# Patient Record
Sex: FEMALE | Race: WHITE
[De-identification: names, ages, dates, MRNs, and addresses within clinical notes are randomized per-mention and may not be internally consistent; named-entity substitution may affect disease eponyms.]

---

## 2017-10-27 PROBLEM — Z00.00 ENCOUNTER FOR PREVENTIVE HEALTH EXAMINATION: Status: ACTIVE | Noted: 2017-10-27

## 2017-10-30 ENCOUNTER — APPOINTMENT (OUTPATIENT)
Dept: ANTEPARTUM | Facility: CLINIC | Age: 27
End: 2017-10-30
Payer: MEDICAID

## 2017-10-30 ENCOUNTER — ASOB RESULT (OUTPATIENT)
Age: 27
End: 2017-10-30

## 2017-10-30 PROCEDURE — 76819 FETAL BIOPHYS PROFIL W/O NST: CPT

## 2017-10-30 PROCEDURE — 76816 OB US FOLLOW-UP PER FETUS: CPT

## 2017-12-01 ENCOUNTER — OUTPATIENT (OUTPATIENT)
Dept: INPATIENT UNIT | Facility: HOSPITAL | Age: 27
LOS: 1 days | Discharge: HOME | End: 2017-12-01

## 2017-12-01 DIAGNOSIS — O47.9 FALSE LABOR, UNSPECIFIED: ICD-10-CM

## 2017-12-02 ENCOUNTER — INPATIENT (INPATIENT)
Facility: HOSPITAL | Age: 27
LOS: 2 days | Discharge: HOME | End: 2017-12-05
Attending: OBSTETRICS & GYNECOLOGY | Admitting: OBSTETRICS & GYNECOLOGY

## 2017-12-02 VITALS — HEIGHT: 64 IN | WEIGHT: 229.28 LBS

## 2017-12-02 LAB
ABO RH CONFIRMATION: SIGNIFICANT CHANGE UP
ALBUMIN SERPL ELPH-MCNC: 2.9 G/DL — LOW (ref 3.3–5)
ALP SERPL-CCNC: 157 U/L — HIGH (ref 40–120)
ALT FLD-CCNC: 26 U/L — SIGNIFICANT CHANGE UP (ref 12–78)
ANION GAP SERPL CALC-SCNC: 11 MMOL/L — SIGNIFICANT CHANGE UP (ref 5–17)
APTT BLD: 26.4 SEC — LOW (ref 27.5–37.4)
AST SERPL-CCNC: 26 U/L — SIGNIFICANT CHANGE UP (ref 15–37)
BASOPHILS # BLD AUTO: 0.1 K/UL — SIGNIFICANT CHANGE UP (ref 0–0.2)
BASOPHILS NFR BLD AUTO: 0.4 % — SIGNIFICANT CHANGE UP (ref 0–2)
BILIRUB SERPL-MCNC: 0.4 MG/DL — SIGNIFICANT CHANGE UP (ref 0.2–1.2)
BLD GP AB SCN SERPL QL: SIGNIFICANT CHANGE UP
BUN SERPL-MCNC: 9 MG/DL — SIGNIFICANT CHANGE UP (ref 7–23)
CALCIUM SERPL-MCNC: 9.9 MG/DL — SIGNIFICANT CHANGE UP (ref 8.5–10.1)
CHLORIDE SERPL-SCNC: 108 MMOL/L — SIGNIFICANT CHANGE UP (ref 96–108)
CO2 SERPL-SCNC: 19 MMOL/L — LOW (ref 22–31)
CREAT SERPL-MCNC: 0.61 MG/DL — SIGNIFICANT CHANGE UP (ref 0.5–1.3)
EOSINOPHIL # BLD AUTO: 0.1 K/UL — SIGNIFICANT CHANGE UP (ref 0–0.5)
EOSINOPHIL NFR BLD AUTO: 1.1 % — SIGNIFICANT CHANGE UP (ref 0–6)
GLUCOSE SERPL-MCNC: 111 MG/DL — HIGH (ref 70–99)
HCT VFR BLD CALC: 38.6 % — SIGNIFICANT CHANGE UP (ref 34.5–45)
HCT VFR BLD CALC: 39.3 % — SIGNIFICANT CHANGE UP (ref 34.5–45)
HCT VFR BLD CALC: 39.5 % — SIGNIFICANT CHANGE UP (ref 34.5–45)
HGB BLD-MCNC: 12.6 G/DL — SIGNIFICANT CHANGE UP (ref 11.5–15.5)
HGB BLD-MCNC: 12.9 G/DL — SIGNIFICANT CHANGE UP (ref 11.5–15.5)
HGB BLD-MCNC: 13.2 G/DL — SIGNIFICANT CHANGE UP (ref 11.5–15.5)
INR BLD: 0.88 RATIO — SIGNIFICANT CHANGE UP (ref 0.88–1.16)
LYMPHOCYTES # BLD AUTO: 2.7 K/UL — SIGNIFICANT CHANGE UP (ref 1–3.3)
LYMPHOCYTES # BLD AUTO: 21 % — SIGNIFICANT CHANGE UP (ref 13–44)
MAGNESIUM SERPL-MCNC: 4.2 MG/DL — HIGH (ref 1.6–2.6)
MCHC RBC-ENTMCNC: 28.3 PG — SIGNIFICANT CHANGE UP (ref 27–34)
MCHC RBC-ENTMCNC: 28.4 PG — SIGNIFICANT CHANGE UP (ref 27–34)
MCHC RBC-ENTMCNC: 29.3 PG — SIGNIFICANT CHANGE UP (ref 27–34)
MCHC RBC-ENTMCNC: 32.2 GM/DL — SIGNIFICANT CHANGE UP (ref 32–36)
MCHC RBC-ENTMCNC: 32.6 GM/DL — SIGNIFICANT CHANGE UP (ref 32–36)
MCHC RBC-ENTMCNC: 34.2 GM/DL — SIGNIFICANT CHANGE UP (ref 32–36)
MCV RBC AUTO: 85.8 FL — SIGNIFICANT CHANGE UP (ref 80–100)
MCV RBC AUTO: 87.2 FL — SIGNIFICANT CHANGE UP (ref 80–100)
MCV RBC AUTO: 87.9 FL — SIGNIFICANT CHANGE UP (ref 80–100)
MONOCYTES # BLD AUTO: 0.8 K/UL — SIGNIFICANT CHANGE UP (ref 0–0.9)
MONOCYTES NFR BLD AUTO: 6.3 % — SIGNIFICANT CHANGE UP (ref 2–14)
NEUTROPHILS # BLD AUTO: 9.3 K/UL — HIGH (ref 1.8–7.4)
NEUTROPHILS NFR BLD AUTO: 71.2 % — SIGNIFICANT CHANGE UP (ref 43–77)
PLATELET # BLD AUTO: 152 K/UL — SIGNIFICANT CHANGE UP (ref 150–400)
PLATELET # BLD AUTO: 159 K/UL — SIGNIFICANT CHANGE UP (ref 150–400)
PLATELET # BLD AUTO: 170 K/UL — SIGNIFICANT CHANGE UP (ref 150–400)
POTASSIUM SERPL-MCNC: 3.8 MMOL/L — SIGNIFICANT CHANGE UP (ref 3.5–5.3)
POTASSIUM SERPL-SCNC: 3.8 MMOL/L — SIGNIFICANT CHANGE UP (ref 3.5–5.3)
PROT SERPL-MCNC: 7 GM/DL — SIGNIFICANT CHANGE UP (ref 6–8.3)
PROTHROM AB SERPL-ACNC: 9.5 SEC — LOW (ref 9.8–12.7)
RBC # BLD: 4.47 M/UL — SIGNIFICANT CHANGE UP (ref 3.8–5.2)
RBC # BLD: 4.51 M/UL — SIGNIFICANT CHANGE UP (ref 3.8–5.2)
RBC # BLD: 4.53 M/UL — SIGNIFICANT CHANGE UP (ref 3.8–5.2)
RBC # FLD: 13.7 % — SIGNIFICANT CHANGE UP (ref 10.3–14.5)
RBC # FLD: 13.8 % — SIGNIFICANT CHANGE UP (ref 10.3–14.5)
RBC # FLD: 13.9 % — SIGNIFICANT CHANGE UP (ref 10.3–14.5)
SODIUM SERPL-SCNC: 138 MMOL/L — SIGNIFICANT CHANGE UP (ref 135–145)
T PALLIDUM AB TITR SER: NEGATIVE — SIGNIFICANT CHANGE UP
TYPE + AB SCN PNL BLD: SIGNIFICANT CHANGE UP
WBC # BLD: 13.1 K/UL — HIGH (ref 3.8–10.5)
WBC # BLD: 14.5 K/UL — HIGH (ref 3.8–10.5)
WBC # BLD: 16.8 K/UL — HIGH (ref 3.8–10.5)
WBC # FLD AUTO: 13.1 K/UL — HIGH (ref 3.8–10.5)
WBC # FLD AUTO: 14.5 K/UL — HIGH (ref 3.8–10.5)
WBC # FLD AUTO: 16.8 K/UL — HIGH (ref 3.8–10.5)

## 2017-12-02 RX ORDER — OXYTOCIN 10 UNIT/ML
333.33 VIAL (ML) INJECTION
Qty: 20 | Refills: 0 | Status: COMPLETED | OUTPATIENT
Start: 2017-12-02

## 2017-12-02 RX ORDER — SODIUM CHLORIDE 9 MG/ML
1000 INJECTION, SOLUTION INTRAVENOUS
Qty: 0 | Refills: 0 | Status: DISCONTINUED | OUTPATIENT
Start: 2017-12-02 | End: 2017-12-05

## 2017-12-02 RX ORDER — DEXAMETHASONE 0.5 MG/5ML
4 ELIXIR ORAL EVERY 6 HOURS
Qty: 0 | Refills: 0 | Status: DISCONTINUED | OUTPATIENT
Start: 2017-12-02 | End: 2017-12-05

## 2017-12-02 RX ORDER — MAGNESIUM SULFATE 500 MG/ML
2 VIAL (ML) INJECTION
Qty: 4 | Refills: 0 | Status: DISCONTINUED | OUTPATIENT
Start: 2017-12-02 | End: 2017-12-02

## 2017-12-02 RX ORDER — MAGNESIUM SULFATE 500 MG/ML
4 VIAL (ML) INJECTION ONCE
Qty: 0 | Refills: 0 | Status: COMPLETED | OUTPATIENT
Start: 2017-12-02 | End: 2017-12-02

## 2017-12-02 RX ORDER — OXYTOCIN 10 UNIT/ML
2 VIAL (ML) INJECTION
Qty: 30 | Refills: 0 | Status: DISCONTINUED | OUTPATIENT
Start: 2017-12-02 | End: 2017-12-05

## 2017-12-02 RX ORDER — MAGNESIUM SULFATE 500 MG/ML
2 VIAL (ML) INJECTION
Qty: 4 | Refills: 0 | Status: DISCONTINUED | OUTPATIENT
Start: 2017-12-02 | End: 2017-12-03

## 2017-12-02 RX ORDER — OXYTOCIN 10 UNIT/ML
333.33 VIAL (ML) INJECTION
Qty: 20 | Refills: 0 | Status: COMPLETED | OUTPATIENT
Start: 2017-12-02 | End: 2017-12-02

## 2017-12-02 RX ORDER — CITRIC ACID/SODIUM CITRATE 300-500 MG
15 SOLUTION, ORAL ORAL EVERY 4 HOURS
Qty: 0 | Refills: 0 | Status: DISCONTINUED | OUTPATIENT
Start: 2017-12-02 | End: 2017-12-02

## 2017-12-02 RX ORDER — FERROUS SULFATE 325(65) MG
325 TABLET ORAL DAILY
Qty: 0 | Refills: 0 | Status: DISCONTINUED | OUTPATIENT
Start: 2017-12-02 | End: 2017-12-05

## 2017-12-02 RX ORDER — HYDROMORPHONE HYDROCHLORIDE 2 MG/ML
1 INJECTION INTRAMUSCULAR; INTRAVENOUS; SUBCUTANEOUS
Qty: 0 | Refills: 0 | Status: DISCONTINUED | OUTPATIENT
Start: 2017-12-02 | End: 2017-12-05

## 2017-12-02 RX ORDER — ACETAMINOPHEN 500 MG
650 TABLET ORAL EVERY 6 HOURS
Qty: 0 | Refills: 0 | Status: DISCONTINUED | OUTPATIENT
Start: 2017-12-02 | End: 2017-12-05

## 2017-12-02 RX ORDER — NALOXONE HYDROCHLORIDE 4 MG/.1ML
0.1 SPRAY NASAL
Qty: 0 | Refills: 0 | Status: DISCONTINUED | OUTPATIENT
Start: 2017-12-02 | End: 2017-12-05

## 2017-12-02 RX ORDER — OXYCODONE AND ACETAMINOPHEN 5; 325 MG/1; MG/1
1 TABLET ORAL
Qty: 0 | Refills: 0 | Status: DISCONTINUED | OUTPATIENT
Start: 2017-12-02 | End: 2017-12-05

## 2017-12-02 RX ORDER — OXYTOCIN 10 UNIT/ML
41.67 VIAL (ML) INJECTION
Qty: 20 | Refills: 0 | Status: DISCONTINUED | OUTPATIENT
Start: 2017-12-02 | End: 2017-12-05

## 2017-12-02 RX ORDER — DOCUSATE SODIUM 100 MG
100 CAPSULE ORAL
Qty: 0 | Refills: 0 | Status: DISCONTINUED | OUTPATIENT
Start: 2017-12-02 | End: 2017-12-05

## 2017-12-02 RX ORDER — HYDRALAZINE HCL 50 MG
5 TABLET ORAL ONCE
Qty: 0 | Refills: 0 | Status: DISCONTINUED | OUTPATIENT
Start: 2017-12-02 | End: 2017-12-05

## 2017-12-02 RX ORDER — OXYCODONE AND ACETAMINOPHEN 5; 325 MG/1; MG/1
2 TABLET ORAL EVERY 6 HOURS
Qty: 0 | Refills: 0 | Status: DISCONTINUED | OUTPATIENT
Start: 2017-12-02 | End: 2017-12-05

## 2017-12-02 RX ORDER — LANOLIN
1 OINTMENT (GRAM) TOPICAL
Qty: 0 | Refills: 0 | Status: DISCONTINUED | OUTPATIENT
Start: 2017-12-02 | End: 2017-12-05

## 2017-12-02 RX ORDER — DIPHENHYDRAMINE HCL 50 MG
25 CAPSULE ORAL EVERY 6 HOURS
Qty: 0 | Refills: 0 | Status: DISCONTINUED | OUTPATIENT
Start: 2017-12-02 | End: 2017-12-05

## 2017-12-02 RX ORDER — OXYTOCIN 10 UNIT/ML
333.33 VIAL (ML) INJECTION
Qty: 20 | Refills: 0 | Status: DISCONTINUED | OUTPATIENT
Start: 2017-12-02 | End: 2017-12-05

## 2017-12-02 RX ORDER — TETANUS TOXOID, REDUCED DIPHTHERIA TOXOID AND ACELLULAR PERTUSSIS VACCINE, ADSORBED 5; 2.5; 8; 8; 2.5 [IU]/.5ML; [IU]/.5ML; UG/.5ML; UG/.5ML; UG/.5ML
0.5 SUSPENSION INTRAMUSCULAR ONCE
Qty: 0 | Refills: 0 | Status: DISCONTINUED | OUTPATIENT
Start: 2017-12-02 | End: 2017-12-03

## 2017-12-02 RX ORDER — OXYCODONE HYDROCHLORIDE 5 MG/1
10 TABLET ORAL
Qty: 0 | Refills: 0 | Status: DISCONTINUED | OUTPATIENT
Start: 2017-12-02 | End: 2017-12-05

## 2017-12-02 RX ORDER — SIMETHICONE 80 MG/1
80 TABLET, CHEWABLE ORAL EVERY 4 HOURS
Qty: 0 | Refills: 0 | Status: DISCONTINUED | OUTPATIENT
Start: 2017-12-02 | End: 2017-12-05

## 2017-12-02 RX ORDER — DIPHENHYDRAMINE HCL 50 MG
25 CAPSULE ORAL EVERY 4 HOURS
Qty: 0 | Refills: 0 | Status: DISCONTINUED | OUTPATIENT
Start: 2017-12-02 | End: 2017-12-05

## 2017-12-02 RX ORDER — SODIUM CHLORIDE 9 MG/ML
1000 INJECTION, SOLUTION INTRAVENOUS
Qty: 0 | Refills: 0 | Status: DISCONTINUED | OUTPATIENT
Start: 2017-12-02 | End: 2017-12-03

## 2017-12-02 RX ORDER — IBUPROFEN 200 MG
600 TABLET ORAL EVERY 6 HOURS
Qty: 0 | Refills: 0 | Status: DISCONTINUED | OUTPATIENT
Start: 2017-12-02 | End: 2017-12-05

## 2017-12-02 RX ORDER — OXYCODONE HYDROCHLORIDE 5 MG/1
5 TABLET ORAL
Qty: 0 | Refills: 0 | Status: DISCONTINUED | OUTPATIENT
Start: 2017-12-02 | End: 2017-12-05

## 2017-12-02 RX ORDER — ONDANSETRON 8 MG/1
4 TABLET, FILM COATED ORAL EVERY 6 HOURS
Qty: 0 | Refills: 0 | Status: DISCONTINUED | OUTPATIENT
Start: 2017-12-02 | End: 2017-12-05

## 2017-12-02 RX ORDER — MORPHINE SULFATE 50 MG/1
5 CAPSULE, EXTENDED RELEASE ORAL ONCE
Qty: 0 | Refills: 0 | Status: DISCONTINUED | OUTPATIENT
Start: 2017-12-02 | End: 2017-12-05

## 2017-12-02 RX ORDER — GLYCERIN ADULT
1 SUPPOSITORY, RECTAL RECTAL AT BEDTIME
Qty: 0 | Refills: 0 | Status: DISCONTINUED | OUTPATIENT
Start: 2017-12-02 | End: 2017-12-05

## 2017-12-02 RX ORDER — LABETALOL HCL 100 MG
200 TABLET ORAL EVERY 8 HOURS
Qty: 0 | Refills: 0 | Status: DISCONTINUED | OUTPATIENT
Start: 2017-12-02 | End: 2017-12-03

## 2017-12-02 RX ORDER — BUTORPHANOL TARTRATE 2 MG/ML
1 INJECTION, SOLUTION INTRAMUSCULAR; INTRAVENOUS ONCE
Qty: 0 | Refills: 0 | Status: DISCONTINUED | OUTPATIENT
Start: 2017-12-02 | End: 2017-12-02

## 2017-12-02 RX ORDER — SODIUM CHLORIDE 9 MG/ML
1000 INJECTION, SOLUTION INTRAVENOUS ONCE
Qty: 0 | Refills: 0 | Status: COMPLETED | OUTPATIENT
Start: 2017-12-02 | End: 2017-12-02

## 2017-12-02 RX ORDER — SODIUM CHLORIDE 9 MG/ML
1000 INJECTION, SOLUTION INTRAVENOUS
Qty: 0 | Refills: 0 | Status: DISCONTINUED | OUTPATIENT
Start: 2017-12-02 | End: 2017-12-02

## 2017-12-02 RX ADMIN — Medication 1000 MILLIUNIT(S)/MIN: at 14:45

## 2017-12-02 RX ADMIN — HYDROMORPHONE HYDROCHLORIDE 1 MILLIGRAM(S): 2 INJECTION INTRAMUSCULAR; INTRAVENOUS; SUBCUTANEOUS at 19:51

## 2017-12-02 RX ADMIN — Medication 2 MILLIUNIT(S)/MIN: at 08:35

## 2017-12-02 RX ADMIN — Medication 50 GM/HR: at 17:27

## 2017-12-02 RX ADMIN — BUTORPHANOL TARTRATE 1 MILLIGRAM(S): 2 INJECTION, SOLUTION INTRAMUSCULAR; INTRAVENOUS at 07:00

## 2017-12-02 RX ADMIN — SODIUM CHLORIDE 125 MILLILITER(S): 9 INJECTION, SOLUTION INTRAVENOUS at 07:26

## 2017-12-02 RX ADMIN — SODIUM CHLORIDE 125 MILLILITER(S): 9 INJECTION, SOLUTION INTRAVENOUS at 07:08

## 2017-12-02 RX ADMIN — Medication 300 GRAM(S): at 14:15

## 2017-12-02 RX ADMIN — SODIUM CHLORIDE 2000 MILLILITER(S): 9 INJECTION, SOLUTION INTRAVENOUS at 05:20

## 2017-12-02 RX ADMIN — Medication 200 MILLIGRAM(S): at 19:37

## 2017-12-02 RX ADMIN — Medication 50 GM/HR: at 21:06

## 2017-12-02 RX ADMIN — HYDROMORPHONE HYDROCHLORIDE 1 MILLIGRAM(S): 2 INJECTION INTRAMUSCULAR; INTRAVENOUS; SUBCUTANEOUS at 16:20

## 2017-12-02 RX ADMIN — Medication 50 GM/HR: at 23:13

## 2017-12-02 RX ADMIN — Medication 50 GM/HR: at 17:23

## 2017-12-02 NOTE — PATIENT PROFILE OB - VISION (WITH CORRECTIVE LENSES IF THE PATIENT USUALLY WEARS THEM):
moderate assist (50% patients effort) Normal vision: sees adequately in most situations; can see medication labels, newsprint

## 2017-12-02 NOTE — PROVIDER CONTACT NOTE (OTHER) - SITUATION
Pt primary c/s delivery @ 14:42, recovery began at 1525. Pt began magnesium sulfate infusion @ 1415 due to preeclampsia. Received care of pt @1915.

## 2017-12-02 NOTE — PROVIDER CONTACT NOTE (OTHER) - ACTION/TREATMENT ORDERED:
MD Giles made aware, pt assessed, no new orders at this time for medications. Labs to be drawn @ 20:00, will continue to monitor.

## 2017-12-03 ENCOUNTER — TRANSCRIPTION ENCOUNTER (OUTPATIENT)
Age: 27
End: 2017-12-03

## 2017-12-03 LAB
BASOPHILS # BLD AUTO: 0 K/UL — SIGNIFICANT CHANGE UP (ref 0–0.2)
BASOPHILS NFR BLD AUTO: 0.3 % — SIGNIFICANT CHANGE UP (ref 0–2)
EOSINOPHIL # BLD AUTO: 0 K/UL — SIGNIFICANT CHANGE UP (ref 0–0.5)
EOSINOPHIL NFR BLD AUTO: 0.1 % — SIGNIFICANT CHANGE UP (ref 0–6)
HCT VFR BLD CALC: 35.8 % — SIGNIFICANT CHANGE UP (ref 34.5–45)
HGB BLD-MCNC: 12.1 G/DL — SIGNIFICANT CHANGE UP (ref 11.5–15.5)
LYMPHOCYTES # BLD AUTO: 1.5 K/UL — SIGNIFICANT CHANGE UP (ref 1–3.3)
LYMPHOCYTES # BLD AUTO: 9.2 % — LOW (ref 13–44)
MAGNESIUM SERPL-MCNC: 4.4 MG/DL — HIGH (ref 1.6–2.6)
MAGNESIUM SERPL-MCNC: 4.4 MG/DL — HIGH (ref 1.6–2.6)
MCHC RBC-ENTMCNC: 29.4 PG — SIGNIFICANT CHANGE UP (ref 27–34)
MCHC RBC-ENTMCNC: 33.9 GM/DL — SIGNIFICANT CHANGE UP (ref 32–36)
MCV RBC AUTO: 86.8 FL — SIGNIFICANT CHANGE UP (ref 80–100)
MONOCYTES # BLD AUTO: 0.9 K/UL — SIGNIFICANT CHANGE UP (ref 0–0.9)
MONOCYTES NFR BLD AUTO: 5.6 % — SIGNIFICANT CHANGE UP (ref 2–14)
NEUTROPHILS # BLD AUTO: 13.4 K/UL — HIGH (ref 1.8–7.4)
NEUTROPHILS NFR BLD AUTO: 84.8 % — HIGH (ref 43–77)
PLATELET # BLD AUTO: 148 K/UL — LOW (ref 150–400)
RBC # BLD: 4.13 M/UL — SIGNIFICANT CHANGE UP (ref 3.8–5.2)
RBC # FLD: 13.8 % — SIGNIFICANT CHANGE UP (ref 10.3–14.5)
WBC # BLD: 15.8 K/UL — HIGH (ref 3.8–10.5)
WBC # FLD AUTO: 15.8 K/UL — HIGH (ref 3.8–10.5)

## 2017-12-03 RX ORDER — OXYCODONE HYDROCHLORIDE 5 MG/1
1 TABLET ORAL
Qty: 30 | Refills: 0 | OUTPATIENT
Start: 2017-12-03

## 2017-12-03 RX ORDER — SENNA PLUS 8.6 MG/1
2 TABLET ORAL AT BEDTIME
Qty: 0 | Refills: 0 | Status: DISCONTINUED | OUTPATIENT
Start: 2017-12-03 | End: 2017-12-05

## 2017-12-03 RX ORDER — IBUPROFEN 200 MG
1 TABLET ORAL
Qty: 40 | Refills: 2 | OUTPATIENT
Start: 2017-12-03 | End: 2018-03-02

## 2017-12-03 RX ORDER — LABETALOL HCL 100 MG
200 TABLET ORAL
Qty: 0 | Refills: 0 | Status: DISCONTINUED | OUTPATIENT
Start: 2017-12-03 | End: 2017-12-05

## 2017-12-03 RX ADMIN — Medication 200 MILLIGRAM(S): at 03:17

## 2017-12-03 RX ADMIN — Medication 50 GM/HR: at 01:01

## 2017-12-03 RX ADMIN — Medication 1 TABLET(S): at 13:40

## 2017-12-03 RX ADMIN — Medication 100 MILLIGRAM(S): at 13:40

## 2017-12-03 RX ADMIN — Medication 600 MILLIGRAM(S): at 21:22

## 2017-12-03 RX ADMIN — OXYCODONE HYDROCHLORIDE 10 MILLIGRAM(S): 5 TABLET ORAL at 21:22

## 2017-12-03 RX ADMIN — Medication 100 MILLIGRAM(S): at 21:22

## 2017-12-03 RX ADMIN — SIMETHICONE 80 MILLIGRAM(S): 80 TABLET, CHEWABLE ORAL at 21:22

## 2017-12-03 RX ADMIN — Medication 50 GM/HR: at 10:45

## 2017-12-03 RX ADMIN — SODIUM CHLORIDE 125 MILLILITER(S): 9 INJECTION, SOLUTION INTRAVENOUS at 04:37

## 2017-12-03 RX ADMIN — Medication 325 MILLIGRAM(S): at 13:40

## 2017-12-03 RX ADMIN — Medication 50 GM/HR: at 03:18

## 2017-12-03 RX ADMIN — Medication 50 GM/HR: at 08:27

## 2017-12-03 RX ADMIN — Medication 600 MILLIGRAM(S): at 13:40

## 2017-12-03 RX ADMIN — OXYCODONE AND ACETAMINOPHEN 1 TABLET(S): 5; 325 TABLET ORAL at 13:40

## 2017-12-03 NOTE — DISCHARGE NOTE OB - MEDICATION SUMMARY - MEDICATIONS TO TAKE
I will START or STAY ON the medications listed below when I get home from the hospital:    ibuprofen 600 mg oral tablet  -- 1 tab(s) by mouth every 6 hours, As needed, Moderate Pain -for moderate pain MDD:4  -- Indication: For for moderate pain, postpartum    acetaminophen-oxyCODONE 325 mg-5 mg oral tablet  -- 1 tab(s) by mouth every 4 hours, As needed for severe or Moderate postoperative Pain (4 - 6) MDD:8 tablets  -- Indication: For for severe pain only    Prenatal 1 oral capsule  -- 1 cap(s) by mouth once a day  -- Indication: For Continue while breast feeding    calcium-vitamin D 500 mg-200 intl units oral tablet  -- 1 tab(s) by mouth 2 times a day  while breast feeding   -- Indication: For Continue while breast feeding

## 2017-12-03 NOTE — DISCHARGE NOTE OB - PATIENT PORTAL LINK FT
“You can access the FollowHealth Patient Portal, offered by [facility], by registering with the following website: [email or web address]”

## 2017-12-03 NOTE — PROGRESS NOTE ADULT - SUBJECTIVE AND OBJECTIVE BOX
Postpartum Note,  Section #1   Patient is a 27y woman G2 P 2. Developed hypertension post . Was managed with IV magnesium, hydralazine and PO labetolol.     Subjective:  Patient seen and examined at bedside. No acute events overnight.  Pain well controlled with current pain regimen. She is has been on bedrest and ambulated out of bed to sit on a chair. Is tolerating PO diet/fluids. She reports normal postpartum bleeding, having used 3 pads over the last 24 hrs. She is voiding well and reports flatus / BM. Reports breastfeeding without difficulties. Denies fever, headache, nausea, vomiting. Otherwise, patient feels well without additional complaints.     Physical exam:    Vital Signs Last 24 Hrs  T(C): 37.1 (03 Dec 2017 07:20), Max: 37.6 (03 Dec 2017 05:30)  T(F): 98.7 (03 Dec 2017 07:20), Max: 99.6 (03 Dec 2017 05:30)  HR: 111 (03 Dec 2017 07:20) (104 - 131)  BP: 95/54 (03 Dec 2017 07:20) (95/54 - 164/83)  BP(mean): --  RR: 18 (03 Dec 2017 07:20) (10 - 115)  SpO2: 98% (03 Dec 2017 07:20) (95% - 99%)    Gen: NAD  Breast: Soft, nontender, not engorged  Cardio: S1,S2 no murmurs  Lungs: CTA B/L, no wheezing  Abdomen: Soft, nontender, no distension, firm uterine fundus at umbilicus.  Incision: Clean, dry, and intact  Ext: No calf tenderness bilaterally    LABS:                        12.1   15.8  )-----------( 148      ( 03 Dec 2017 07:09 )             35.8       Rubella status:     Allergies    No Known Allergies    Intolerances      MEDICATIONS  (STANDING):  diphtheria/tetanus/pertussis (acellular) Vaccine (ADAcel) 0.5 milliLiter(s) IntraMuscular once  ferrous    sulfate 325 milliGRAM(s) Oral daily  hydrALAZINE Injectable 5 milliGRAM(s) IV Push once  labetalol 200 milliGRAM(s) Oral every 8 hours  lactated ringers. 1000 milliLiter(s) (125 mL/Hr) IV Continuous <Continuous>  lactated ringers. 1000 milliLiter(s) (125 mL/Hr) IV Continuous <Continuous>  magnesium sulfate Infusion 2 Gm/Hr (50 mL/Hr) IV Continuous <Continuous>  morphine PF Epidural 5 milliGRAM(s) Epidural once  oxytocin Infusion 333.333 milliUNIT(s)/Min (1000 mL/Hr) IV Continuous <Continuous>  oxytocin Infusion 41.667 milliUNIT(s)/Min (125 mL/Hr) IV Continuous <Continuous>  oxytocin Infusion 2 milliUNIT(s)/Min (2 mL/Hr) IV Continuous <Continuous>  oxytocin Infusion 41.667 milliUNIT(s)/Min (125 mL/Hr) IV Continuous <Continuous>  prenatal multivitamin 1 Tablet(s) Oral daily    MEDICATIONS  (PRN):  acetaminophen   Tablet 650 milliGRAM(s) Oral every 6 hours PRN For Temp greater than 38.5 C (101.3 F)  dexamethasone  Injectable 4 milliGRAM(s) IV Push every 6 hours PRN Nausea, IF ondansetron is ineffective after 30 - 60 minutes  diphenhydrAMINE   Capsule 25 milliGRAM(s) Oral every 6 hours PRN Itching  diphenhydrAMINE   Injectable 25 milliGRAM(s) IV Push every 4 hours PRN Pruritus  docusate sodium 100 milliGRAM(s) Oral two times a day PRN Stool Softening  glycerin Suppository - Adult 1 Suppository(s) Rectal at bedtime PRN Constipation  HYDROmorphone  Injectable 1 milliGRAM(s) IV Push every 3 hours PRN Severe Pain  ibuprofen  Tablet 600 milliGRAM(s) Oral every 6 hours PRN Mild pain or headache  lanolin Ointment 1 Application(s) Topical every 3 hours PRN Sore Nipples  naloxone Injectable 0.1 milliGRAM(s) IV Push every 3 minutes PRN For ANY of the following changes in patient status:  A. RR LESS THAN 10 breaths per minute, B. Oxygen saturation LESS THAN 90%, C. Sedation score of 6  ondansetron Injectable 4 milliGRAM(s) IV Push every 6 hours PRN Nausea  oxyCODONE    5 mG/acetaminophen 325 mG 1 Tablet(s) Oral every 3 hours PRN Moderate Pain  oxyCODONE    5 mG/acetaminophen 325 mG 2 Tablet(s) Oral every 6 hours PRN Severe Pain  oxyCODONE    IR 5 milliGRAM(s) Oral every 3 hours PRN Mild Pain  oxyCODONE    IR 10 milliGRAM(s) Oral every 3 hours PRN Moderate Pain  simethicone 80 milliGRAM(s) Chew every 4 hours PRN Gas        Assessment and Plan  POD # s/p   -Routine post-op care.  - continue Labetolol 200 mg tid  - continue magnesium 2mg/hr  -Pain well controlled, continue current pain regimen.  -Encouraged ambulation.  -Encouraged PO diet/fluids.  -Encouraged breastfeeding. Postpartum Note,  Section #1   Patient is a 27y woman G2 P 2. Developed hypertension post . Was managed with IV magnesium, hydralazine and PO labetolol.     Subjective:  Patient seen and examined at bedside. No acute events overnight.  Pain well controlled with current pain regimen. She is has been on bedrest and ambulated out of bed to sit on a chair. Is tolerating PO diet/fluids. She reports normal postpartum bleeding, having used 3 pads over the last 24 hrs. She is voiding well and reports flatus / BM. Reports breastfeeding without difficulties. Denies fever, headache, nausea, vomiting. Otherwise, patient feels well without additional complaints.     Physical exam:    Vital Signs Last 24 Hrs  T(C): 37.1 (03 Dec 2017 07:20), Max: 37.6 (03 Dec 2017 05:30)  T(F): 98.7 (03 Dec 2017 07:20), Max: 99.6 (03 Dec 2017 05:30)  HR: 111 (03 Dec 2017 07:20) (104 - 131)  BP: 95/54 (03 Dec 2017 07:20) (95/54 - 164/83)  BP(mean): --  RR: 18 (03 Dec 2017 07:20) (10 - 115)  SpO2: 98% (03 Dec 2017 07:20) (95% - 99%)    Gen: NAD  Breast: Soft, nontender, not engorged  Cardio: S1,S2 no murmurs  Lungs: CTA B/L, no wheezing  Abdomen: Soft, nontender, softly distended, firm uterine fundus at umbilicus.  Incision:  dressing Clean, dry, and intact  Ext: No calf tenderness bilaterally    LABS:                        12.1   15.8  )-----------( 148      ( 03 Dec 2017 07:09 )             35.8       Rubella status:     Allergies    No Known Allergies    Intolerances      MEDICATIONS  (STANDING):  diphtheria/tetanus/pertussis (acellular) Vaccine (ADAcel) 0.5 milliLiter(s) IntraMuscular once  ferrous    sulfate 325 milliGRAM(s) Oral daily  hydrALAZINE Injectable 5 milliGRAM(s) IV Push once  labetalol 200 milliGRAM(s) Oral every 8 hours  lactated ringers. 1000 milliLiter(s) (125 mL/Hr) IV Continuous <Continuous>  lactated ringers. 1000 milliLiter(s) (125 mL/Hr) IV Continuous <Continuous>  magnesium sulfate Infusion 2 Gm/Hr (50 mL/Hr) IV Continuous <Continuous>  morphine PF Epidural 5 milliGRAM(s) Epidural once  oxytocin Infusion 333.333 milliUNIT(s)/Min (1000 mL/Hr) IV Continuous <Continuous>  oxytocin Infusion 41.667 milliUNIT(s)/Min (125 mL/Hr) IV Continuous <Continuous>  oxytocin Infusion 2 milliUNIT(s)/Min (2 mL/Hr) IV Continuous <Continuous>  oxytocin Infusion 41.667 milliUNIT(s)/Min (125 mL/Hr) IV Continuous <Continuous>  prenatal multivitamin 1 Tablet(s) Oral daily    MEDICATIONS  (PRN):  acetaminophen   Tablet 650 milliGRAM(s) Oral every 6 hours PRN For Temp greater than 38.5 C (101.3 F)  dexamethasone  Injectable 4 milliGRAM(s) IV Push every 6 hours PRN Nausea, IF ondansetron is ineffective after 30 - 60 minutes  diphenhydrAMINE   Capsule 25 milliGRAM(s) Oral every 6 hours PRN Itching  diphenhydrAMINE   Injectable 25 milliGRAM(s) IV Push every 4 hours PRN Pruritus  docusate sodium 100 milliGRAM(s) Oral two times a day PRN Stool Softening  glycerin Suppository - Adult 1 Suppository(s) Rectal at bedtime PRN Constipation  HYDROmorphone  Injectable 1 milliGRAM(s) IV Push every 3 hours PRN Severe Pain  ibuprofen  Tablet 600 milliGRAM(s) Oral every 6 hours PRN Mild pain or headache  lanolin Ointment 1 Application(s) Topical every 3 hours PRN Sore Nipples  naloxone Injectable 0.1 milliGRAM(s) IV Push every 3 minutes PRN For ANY of the following changes in patient status:  A. RR LESS THAN 10 breaths per minute, B. Oxygen saturation LESS THAN 90%, C. Sedation score of 6  ondansetron Injectable 4 milliGRAM(s) IV Push every 6 hours PRN Nausea  oxyCODONE    5 mG/acetaminophen 325 mG 1 Tablet(s) Oral every 3 hours PRN Moderate Pain  oxyCODONE    5 mG/acetaminophen 325 mG 2 Tablet(s) Oral every 6 hours PRN Severe Pain  oxyCODONE    IR 5 milliGRAM(s) Oral every 3 hours PRN Mild Pain  oxyCODONE    IR 10 milliGRAM(s) Oral every 3 hours PRN Moderate Pain  simethicone 80 milliGRAM(s) Chew every 4 hours PRN Gas        Assessment and Plan  POD # s/p primary   -Routine post-op care.  - continue Labetolol 200 mg tid  - continue magnesium 2mg/hr- d/c in mid afternoon.  -Pain well controlled, continue current pain regimen.  -Encouraged ambulation.  -Encouraged PO diet/fluids.  -Encouraged breastfeeding.

## 2017-12-03 NOTE — DISCHARGE NOTE OB - HOSPITAL COURSE
----- Message from Analy Menon MD sent at 11/19/2017  7:56 PM CST -----  Fasting BS is very mild 101/99, but otherwise all labs are good  Watch for sweets in the holiday season   28 yo  at 41 weeks gestation presented with ruptured membranes on L+D.  She was admitted for labor.  Meconium stained fluid was noted.  She arrested in dilatation and required a primary  for delivery with  infant weighing 10lb 3oz.  Pt also had elevated blood pressures requiring hydralazine iv push and labetolol 200mg po.  She was given MgSO4 per protocol for approx 24 hours after delivery.  Her postpartum CBC:                        12.1   15.8  )-----------( 148      ( 03 Dec 2017 07:09 )             35.8   Her blood pressures normalized by POD#1 and she required to further antihypertensive.  Her recovery course was uneventful and she is being discharged [facility] in stable condition on postoperative day #3.

## 2017-12-03 NOTE — DISCHARGE NOTE OB - CARE PLAN
Principal Discharge DX:	 delivery, delivered, current hospitalization  Goal:	recovery  Instructions for follow-up, activity and diet:	regular diet, no heavy lifting for six weeks, no sex for six weeks, follow up in one week for incision check in office  Secondary Diagnosis:	Pre-eclampsia affecting childbirth  Goal:	recheck blood pressure in office in one week.

## 2017-12-03 NOTE — DISCHARGE NOTE OB - CARE PROVIDER_API CALL
Yao Silvestre (DO), Obstetrics and Gynecology  [address] [place] [address]  Phone: [phone]  Fax: [phone]

## 2017-12-04 RX ADMIN — OXYCODONE HYDROCHLORIDE 10 MILLIGRAM(S): 5 TABLET ORAL at 05:15

## 2017-12-04 RX ADMIN — SIMETHICONE 80 MILLIGRAM(S): 80 TABLET, CHEWABLE ORAL at 22:23

## 2017-12-04 RX ADMIN — Medication 600 MILLIGRAM(S): at 22:23

## 2017-12-04 RX ADMIN — Medication 1 TABLET(S): at 12:32

## 2017-12-04 RX ADMIN — SIMETHICONE 80 MILLIGRAM(S): 80 TABLET, CHEWABLE ORAL at 05:15

## 2017-12-04 RX ADMIN — SENNA PLUS 2 TABLET(S): 8.6 TABLET ORAL at 22:17

## 2017-12-04 RX ADMIN — Medication 600 MILLIGRAM(S): at 23:30

## 2017-12-04 RX ADMIN — Medication 600 MILLIGRAM(S): at 12:32

## 2017-12-04 RX ADMIN — Medication 600 MILLIGRAM(S): at 05:15

## 2017-12-04 NOTE — PROGRESS NOTE ADULT - SUBJECTIVE AND OBJECTIVE BOX
Postpartum Note,  Section  27y woman post-operative day:  #2    Subjective:  She is ambulating and tolerating regular diet.  Pt denies dizziness  She denies nausea and vomiting.    Physical exam:    Vital Signs Last 24 Hrs  T(C): 36.4 (04 Dec 2017 08:36), Max: 37.9 (03 Dec 2017 14:00)  T(F): 97.5 (04 Dec 2017 08:36), Max: 100.3 (03 Dec 2017 14:00)  HR: 101 (04 Dec 2017 08:36) (100 - 123)  BP: 129/79 (04 Dec 2017 08:36) (101/53 - 136/67)  RR: 16 (04 Dec 2017 08:36) (16 - 18)  SpO2: 98% (04 Dec 2017 08:36) (98% - 100%)  Lungs:  clear to auscultation bilaterally.  Abdomen: Soft, nontender, no distension , firm uterine fundus at umbilicus.  Incision: Clean, dry, and intact.  Pelvic: light to moderate lochia noted  Ext: No calf tenderness    LABS:                        12.1   15.8  )-----------( 148      ( 03 Dec 2017 07:09 )             35.8         Assessment and Plan  POD #2   s/p primary , s/p MgSO4 til yesterday afternoon.  Doing well.  Encourage ambulation.

## 2017-12-05 VITALS
SYSTOLIC BLOOD PRESSURE: 130 MMHG | TEMPERATURE: 97 F | HEART RATE: 104 BPM | RESPIRATION RATE: 16 BRPM | OXYGEN SATURATION: 99 % | DIASTOLIC BLOOD PRESSURE: 82 MMHG

## 2017-12-05 NOTE — PROGRESS NOTE ADULT - SUBJECTIVE AND OBJECTIVE BOX
Postpartum Note,  Section  27y woman post-operative day:  #3    Subjective:  She is ambulating and tolerating regular diet.   She is passing flatus.    Physical exam:    Vital Signs Last 24 Hrs  T(C): 36.3 (05 Dec 2017 07:53), Max: 37.6 (04 Dec 2017 20:56)  T(F): 97.4 (05 Dec 2017 07:53), Max: 99.6 (04 Dec 2017 20:56)  HR: 104 (05 Dec 2017 07:53) (98 - 104)  BP: 130/82 (05 Dec 2017 07:53) (130/82 - 142/78)  RR: 16 (05 Dec 2017 07:53) (16 - 16)  SpO2: 99% (05 Dec 2017 07:53) (98% - 100%)  Lungs:  clear to auscultation bilaterally.  Abdomen: Soft, nontender, no distention , firm uterine fundus at umbilicus.  Incision: Clean, dry, and intact.  Pelvic: light  lochia noted  Ext: No calf tenderness    Assessment and Plan  POD #3   s/p primary   Doing well.  No evidence of elevated blood pressures.  Will check BP in one week in the office.  Encourage ambulation.  Follow up in 4-7 days for post-op incision check, then in six weeks for postpartum appointment.  Pt advised to follow up sooner as an outpt if she has any difficulties with breast feeding/depression/temperature over 100.2'F.

## 2017-12-07 DIAGNOSIS — O47.9 FALSE LABOR, UNSPECIFIED: ICD-10-CM

## 2017-12-10 DIAGNOSIS — Z3A.41 41 WEEKS GESTATION OF PREGNANCY: ICD-10-CM

## 2018-01-26 NOTE — PATIENT PROFILE OB - BREAST MILK PROVIDES PROTECTION AGAINST INFECTION
Statement Selected External Cooling Fan Speed: 5 Energy (Optional- Include Units): 34 Pre-Procedure Text: After consent was obtained, the treatment areas were cleansed and treated using the parameters mentioned above. Detail Level: Zone Total Pulses (Optional): 15 Energy (Optional- Include Units): 34 Anesthesia Type: 1% lidocaine with epinephrine Consent: Written consent obtained, risks reviewed including but not limited to crusting, scabbing, blistering, scarring, darker or lighter pigmentary change, bruising, and/or incomplete response. Post-Care Instructions: I reviewed with the patient in detail post-care instructions. Patient should stay away from the sun and wear sun protection until treated areas are fully healed. Contact: Light Anesthesia Volume In Cc: 0 Treatment Number (Optional): 1

## 2019-05-31 NOTE — DISCHARGE NOTE OB - PLAN OF CARE
recovery regular diet, no heavy lifting for six weeks, no sex for six weeks, follow up in one week for incision check in office recheck blood pressure in office in one week. n/a

## 2020-09-15 ENCOUNTER — EMERGENCY (EMERGENCY)
Facility: HOSPITAL | Age: 30
LOS: 0 days | Discharge: HOME | End: 2020-09-15
Attending: EMERGENCY MEDICINE
Payer: MEDICAID

## 2020-09-15 VITALS
HEART RATE: 80 BPM | DIASTOLIC BLOOD PRESSURE: 86 MMHG | OXYGEN SATURATION: 100 % | RESPIRATION RATE: 18 BRPM | TEMPERATURE: 98 F | SYSTOLIC BLOOD PRESSURE: 156 MMHG

## 2020-09-15 VITALS — HEIGHT: 64 IN

## 2020-09-15 DIAGNOSIS — S61.012A LACERATION WITHOUT FOREIGN BODY OF LEFT THUMB WITHOUT DAMAGE TO NAIL, INITIAL ENCOUNTER: ICD-10-CM
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
## 2020-09-15 DIAGNOSIS — Z23 ENCOUNTER FOR IMMUNIZATION: ICD-10-CM
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
## 2020-09-15 PROCEDURE — 12002 RPR S/N/AX/GEN/TRNK2.6-7.5CM: CPT

## 2020-09-15 PROCEDURE — 99283 EMERGENCY DEPT VISIT LOW MDM: CPT | Mod: 25

## 2020-09-15 PROCEDURE — 73130 X-RAY EXAM OF HAND: CPT | Mod: 26,LT

## 2020-09-15 PROCEDURE — 99284 EMERGENCY DEPT VISIT MOD MDM: CPT

## 2020-09-15 PROCEDURE — 73130 X-RAY EXAM OF HAND: CPT | Mod: LT

## 2020-09-15 PROCEDURE — 90471 IMMUNIZATION ADMIN: CPT

## 2020-09-15 PROCEDURE — 90715 TDAP VACCINE 7 YRS/> IM: CPT

## 2020-09-15 RX ORDER — CEPHALEXIN 500 MG
500 CAPSULE ORAL ONCE
Refills: 0 | Status: COMPLETED | OUTPATIENT
Start: 2020-09-15 | End: 2020-09-15

## 2020-09-15 RX ORDER — CEPHALEXIN 500 MG
1 CAPSULE ORAL
Qty: 6 | Refills: 0
Start: 2020-09-15 | End: 2020-09-16

## 2020-09-15 RX ORDER — TETANUS TOXOID, REDUCED DIPHTHERIA TOXOID AND ACELLULAR PERTUSSIS VACCINE, ADSORBED 5; 2.5; 8; 8; 2.5 [IU]/.5ML; [IU]/.5ML; UG/.5ML; UG/.5ML; UG/.5ML
0.5 SUSPENSION INTRAMUSCULAR ONCE
Refills: 0 | Status: COMPLETED | OUTPATIENT
Start: 2020-09-15 | End: 2020-09-15

## 2020-09-15 RX ADMIN — Medication 500 MILLIGRAM(S): at 18:49

## 2020-09-15 RX ADMIN — TETANUS TOXOID, REDUCED DIPHTHERIA TOXOID AND ACELLULAR PERTUSSIS VACCINE, ADSORBED 0.5 MILLILITER(S): 5; 2.5; 8; 8; 2.5 SUSPENSION INTRAMUSCULAR at 18:49

## 2020-09-15 NOTE — ED ADULT TRIAGE NOTE - CHIEF COMPLAINT QUOTE
Patient presents to ED complaining of laceration to right hand after cutting it with a knife at work. Pt states that her boss put salt on it to help clot the blood. Bleeding controlled in triage.

## 2020-09-15 NOTE — ED STATDOCS - CARE PLAN
Principal Discharge DX:	Laceration of hand without foreign body, unspecified laterality, initial encounter  Goal:	Laceration of the hand, spoke with Dr. Sanches, Dr. Sanches wishes for us to close the wound, to see her in the office tomorrow.

## 2020-09-15 NOTE — ED STATDOCS - MDM PATIENT STATEMENT FOR ADDL TREATMENT
Dr. Obrien was able to see patient on floor before discharging. Dr. Obrien stated, \"he's okay\" to discharge.    Patient with one or more new problems requiring additional work-up/treatment.

## 2020-09-15 NOTE — ED ADULT NURSE NOTE - OBJECTIVE STATEMENT
pt to ed from work after sustaining laceration to right and near thumb with a knife. complains of mild pain. minimal bleedining. no other complaints noted. pt a&ox4, ambulatory. able to move hand, denies numbness and tingling. pulses palpable. skin warm and pink. No distress.

## 2020-09-15 NOTE — ED STATDOCS - EYES, MLM
clear bilaterally.  Pupils equal, round, and reactive to light. clear bilaterally.  Pupils equal, round, and reactive to light. Extraocular movements are intact. Visual fields are intact.

## 2020-09-15 NOTE — ED STATDOCS - CARDIAC, MLM
normal rate, regular rhythm, and no murmur. normal rate, regular rhythm, and no gallops or rubs. 2+ pulses in dp and radial arteries.

## 2020-09-15 NOTE — ED STATDOCS - ATTENDING CONTRIBUTION TO CARE
I Sonido Hendricks MD saw and examined the patient. MLP saw and examined the patient under my supervision. I discussed the care of the patient with MLP and agree with MLP's plan, assessment and care of the patient while in the ED.

## 2020-09-15 NOTE — ED STATDOCS - CLINICAL SUMMARY MEDICAL DECISION MAKING FREE TEXT BOX
Plan for x-ray of hand, irrigation of wound to clear the wound from salt and debris. Laceration is deep and overlying tendon of thumb, will consult hand to see if hand is comfortable with us repairing it versus whether ortho should come down for repair.

## 2020-09-15 NOTE — ED STATDOCS - CONSTITUTIONAL, MLM
normal... well appearing and in no apparent distress. Non-toxic, well appearing and in no apparent distress. Non-toxic, well appearing and in no apparent distress. NAD.

## 2020-09-15 NOTE — ED STATDOCS - OBJECTIVE STATEMENT
30 YOF no PMHx presents to the ED s/p laceration at approximately 16:30 today. Pt says she accidently cut the dorsal aspect of her left hand, between the webbing of the index finger and thumb, while cutting some lettuce PTA. Pt applied salt to the region and came to ED for further care and evaluation. Denies f/c, cough, chest pain, shortness of breath, abd pain, hematochezia, melena, hematemesis, headache. No PSHx. Tetanus is not UTD. NKDA. Non-smoker. No EtOH use. No illicit drug use.

## 2020-09-15 NOTE — ED PROVIDER NOTE - CARE PLAN
Principal Discharge DX:	Laceration of hand, foreign body presence unspecified, unspecified laterality, initial encounter  Goal:	Spoke with Ortho hand, ortho hand Dr. Sanches states patient can go home, will see patient in the office tomorrow

## 2020-09-15 NOTE — ED STATDOCS - SKIN, MLM
skin normal color for race, warm, dry and intact. +1 inch laceration on the left hand on the dorsum on the webbing between index finger and thumb.

## 2020-09-15 NOTE — ED STATDOCS - PROGRESS NOTE DETAILS
Ashok ROBERTSON for ED attending Dr. Hendricks: Spoke with Dr. Sanches who is ortho hand on call, explained my concern for small laceration of tendon in the thumb that is likely extensor pollicis longus. She explained there is no emergent need to repair tendon in the ED, recommends we close the wound. She also recommends pt to be on ABx and wishes pt to call her tomorrow at which point she will evaluate to see if pt needs further exploration. Return contact: [phone]. Repair of right hand laceration documented in procedure note. [place] NP Reviewed instructions with patient  ID # [id number]. Mikel BARAHONA Ashok ROBERTSON for ED attending Dr. Hendricks: Spoke with Dr. Sanches who is ortho hand on call, explained my concern for small laceration of tendon in the thumb that is likely extensor pollicis longus. She explained there is no emergent need to repair tendon in the ED, no need to see patient or sent residents, recommends we close the wound. She also recommends pt to be on ABx and wishes pt to call her tomorrow at which point she will evaluate to see if pt needs further exploration. Return contact: [phone].

## 2020-09-15 NOTE — ED STATDOCS - NS_ ATTENDINGSCRIBEDETAILS _ED_A_ED_FT
I Sonido Hendricks MD saw and examined the patient. Scribe documented for me and under my supervision. I have modified the scribe's documentation where necessary to reflect my history, physical exam and other relevant documentations pertinent to the care of the patient.

## 2020-09-15 NOTE — ED STATDOCS - CARE PROVIDER_API CALL
Azeb Sanches)  Orthopaedic Surgery  [address] [place] [address]  Phone: [phone]  Fax: [phone]  Follow Up Time:

## 2020-09-15 NOTE — ED STATDOCS - PATIENT PORTAL LINK FT
You can access the FollowMyHealth Patient Portal offered by [facility] by registering at the following website: [email or web address]. By joining [employer]’s FollowMyHealth portal, you will also be able to view your health information using other applications (apps) compatible with our system.

## 2020-09-15 NOTE — ED STATDOCS - PLAN OF CARE
Laceration of the hand, spoke with Dr. Sanches, Dr. Sanches wishes for us to close the wound, to see her in the office tomorrow.

## 2020-09-15 NOTE — ED STATDOCS - NEUROLOGICAL, MLM
+1 inch laceration on the left hand on the dorsum on the webbing between index finger and thumb. NIH = 0. GCS = 15. 5/5 strength on flexion and extension b/l ue and le. NIH = 0. GCS = 15. 5/5 strength on flexion and extension b/l ue and le. CNs 2-12 intact. No nuchal rigidity.

## 2020-09-15 NOTE — ED PROVIDER NOTE - PLAN OF CARE
Spoke with Ortho hand, ortho hand Dr. Sanches states patient can go home, will see patient in the office tomorrow

## 2020-09-15 NOTE — ED STATDOCS - ENMT, MLM
Nasal mucosa clear.  Mouth with normal mucosa  Throat has no vesicles, no oropharyngeal exudates and uvula is midline. Nasal mucosa clear. No nuchal rigidity. Mouth with normal mucosa  Throat has no vesicles, no oropharyngeal exudates and uvula is midline.

## 2020-09-15 NOTE — ED STATDOCS - NSFOLLOWUPINSTRUCTIONS_ED_ALL_ED_FT
Please follow up with your primary care physician. If you have any worsening of the symptoms including worsening pain return to us immediately.    We have performed an x-ray of your hand my evaluation and the radiology report does not show any evidence of any foreign body or any evidence of bone fractures. I have discussed your presentation with our orthopedic hand physician Dr. Sanches. She stated that there is no emergent need for intervention but that she wants you to see her tomorrow in her office. I have provided you with the number for the orthopedic physician for you.     Please note that we have provided you with antibiotics to prevent any infection from developing in the laceration, and that you are to take these antibiotics unless discontinued by Dr. Sanches or any orthopedic hand doctor that you need to follow up with.    Please return to us immediately if you have any other health concerns including any fever, chills, worsening of pain, difficulty moving thumb or finger or any other health concerns.    As we discussed I do not see any immediate evidence of tendon involvement but given the acute nature of the laceration, you are to see Dr. Sanches as soon as possible. Dr. Sanches is expecting you and will see you tomorrow morning.

## 2022-12-07 ENCOUNTER — EMERGENCY (EMERGENCY)
Facility: HOSPITAL | Age: 32
LOS: 1 days | Discharge: HOME | End: 2022-12-07
Attending: EMERGENCY MEDICINE | Admitting: EMERGENCY MEDICINE
Payer: MEDICAID

## 2022-12-07 VITALS
TEMPERATURE: 98 F | OXYGEN SATURATION: 99 % | HEART RATE: 88 BPM | DIASTOLIC BLOOD PRESSURE: 85 MMHG | RESPIRATION RATE: 18 BRPM | SYSTOLIC BLOOD PRESSURE: 135 MMHG

## 2022-12-07 VITALS
TEMPERATURE: 98 F | DIASTOLIC BLOOD PRESSURE: 94 MMHG | WEIGHT: 205.03 LBS | OXYGEN SATURATION: 98 % | SYSTOLIC BLOOD PRESSURE: 150 MMHG | HEART RATE: 110 BPM | RESPIRATION RATE: 22 BRPM | HEIGHT: 65 IN

## 2022-12-07 LAB
FLUAV AG NPH QL: DETECTED
FLUBV AG NPH QL: SIGNIFICANT CHANGE UP
RSV RNA NPH QL NAA+NON-PROBE: SIGNIFICANT CHANGE UP
SARS-COV-2 RNA SPEC QL NAA+PROBE: SIGNIFICANT CHANGE UP

## 2022-12-07 PROCEDURE — 99284 EMERGENCY DEPT VISIT MOD MDM: CPT

## 2022-12-07 PROCEDURE — 99283 EMERGENCY DEPT VISIT LOW MDM: CPT

## 2022-12-07 PROCEDURE — 87637 SARSCOV2&INF A&B&RSV AMP PRB: CPT

## 2022-12-07 RX ORDER — ACETAMINOPHEN 500 MG
650 TABLET ORAL ONCE
Refills: 0 | Status: COMPLETED | OUTPATIENT
Start: 2022-12-07 | End: 2022-12-07

## 2022-12-07 RX ADMIN — Medication 650 MILLIGRAM(S): at 21:33

## 2022-12-07 NOTE — ED ADULT NURSE NOTE - OBJECTIVE STATEMENT
The patient is a 32y Female complaining of headache x 3 days.  Denies visual change, N/V/D, CP or SOB.  Neuro intact.

## 2022-12-07 NOTE — ED PROVIDER NOTE - PATIENT PORTAL LINK FT
You can access the FollowMyHealth Patient Portal offered by [facility] by registering at the following website: [email or web address]. By joining [employer]’s FollowMyHealth portal, you will also be able to view your health information using other applications (apps) compatible with our system.

## 2022-12-07 NOTE — ED PROVIDER NOTE - NSFOLLOWUPINSTRUCTIONS_ED_ALL_ED_FT
Viral Respiratory Infection    A viral respiratory infection is an illness that affects parts of the body used for breathing, like the lungs, nose, and throat. It is caused by a germ called a virus. Symptoms can include runny nose, coughing, sneezing, fatigue, body aches, sore throat, fever, or headache. Over the counter medicine can be used to manage the symptoms but the infection typically goes away on its own in 5 to 10 days.     SEEK IMMEDIATE MEDICAL CARE IF YOU HAVE ANY OF THE FOLLOWING SYMPTOMS: shortness of breath, chest pain, fever over 10 days, or lightheadedness/dizziness.      1. TAKE ALL MEDICATIONS AS DIRECTED.  FOR PAIN YOU CAN TAKE IBUPROFEN (MOTRIN, ADVIL) OR ACETAMINOPHEN (TYLENOL) AS NEEDED, AS DIRECTED ON PACKAGING.  2. FOLLOW UP WITH ___YOUR PRIMARY CARE DOCTOR_______ AS DIRECTED.  YOU WERE GIVEN COPIES OF ALL LABS AND IMAGING RESULTS FROM YOUR ER VISIT--PLEASE TAKE THEM WITH YOU TO YOUR APPOINTMENT.  3. IF NEEDED, CALL [phone] TO FIND A PRIMARY CARE PHYSICIAN.  OR CALL [phone] TO MAKE AN APPOINTMENT WITH THE MEDICAL CLINIC.  4. RETURN TO THE ER FOR ANY WORSENING SYMPTOMS.

## 2022-12-07 NOTE — ED ADULT NURSE NOTE - CHIEF COMPLAINT
Auth: NPR  Date: 8/31/2020  Reference # None  Spoke with: None  Type of SX: Outpatient  Location: [facility]  CPT [id number]   SX area: Rt hand  Insurance: [place]
The patient is a 32y Female complaining of headache.

## 2022-12-07 NOTE — ED PROVIDER NOTE - PHYSICAL EXAMINATION
PE:   GEN: Awake, alert, interactive, NAD, non-toxic appearing.   HEAD: Atraumatic  EYES: Sclera white, conjunctiva pink, PERRLA  MOUTH/THROAT: Patent, uvula midline, no tonsillar edema or erythema, no acute dental findings, no oral lesions or ulcerations, no Hoarse voice  LYMPH: No pre/post auricular, submandibular, or cervical chain lymphadenopathy   CARDIAC: Reg rate and rhythm, S1,S2, no murmur/rub/gallop. Strong central and peripheral pulses, Brisk cap refill, no evident pedal edema.   RESP: No distress noted. L/S clear = Bilat without accessory muscle use, wheeze, rhonchi, rales.   ABD: soft, supple, non-tender, no guarding. BS x 4, normoactive.   NEURO: AOx3, CN II-XII grossly intact without focal deficit.   MSK: Moving all extremities with no apparent deformities.   SKIN: Warm, dry, normal color, without apparent rashes.

## 2022-12-07 NOTE — ED PROVIDER NOTE - OBJECTIVE STATEMENT
31 y/o f with no pmh presents to ED for c.o Headache, sore throat, chills, body aches x 3 days. no fever. Also decreased PO intake. No abd pain nasuea vomit ting or diarrhea. Denies neck or back pain, chest pain, cough, SOB. Pt is vaccinated for covid but not flu.

## 2022-12-07 NOTE — ED PROVIDER NOTE - ATTENDING APP SHARED VISIT CONTRIBUTION OF CARE
Surendra Augustin MD: I have personally performed a face to face diagnostic evaluation on this patient.  I have reviewed the PA note and agree with the history, exam, and plan of care, except as noted.  History and Exam by me shows same findings as documented

## 2022-12-08 PROBLEM — Z78.9 OTHER SPECIFIED HEALTH STATUS: Chronic | Status: ACTIVE | Noted: 2020-09-25

## 2023-02-22 NOTE — ED STATDOCS - PRINCIPAL DIAGNOSIS
02/22/23 1029   Discharge Assessment   Assessment Type Discharge Planning Assessment   Confirmed/corrected address, phone number and insurance Yes   Confirmed Demographics Correct on Facesheet   Source of Information patient   When was your last doctors appointment?   (Patient reports 7th of this month.)   Communicated ANNE with patient/caregiver Yes   Reason For Admission SOB   People in Home significant other   Do you expect to return to your current living situation? Yes   Do you have help at home or someone to help you manage your care at home? Yes   Who are your caregiver(s) and their phone number(s)? Significant Other: Kanu Navarrete: [phone] and Sister (POA): Misty Austin: [phone]   Prior to hospitilization cognitive status: Unable to Assess   Current cognitive status: Alert/Oriented   Home Accessibility stairs to enter home   Number of Stairs, Main Entrance six   Home Layout Able to live on 1st floor   Equipment Currently Used at Home shower chair   Readmission within 30 days? No   Patient currently being followed by outpatient case management? No   Do you currently have service(s) that help you manage your care at home? No   Do you take prescription medications? Yes   Do you have prescription coverage? Yes   Coverage Medicare Part A & B   Do you have any problems affording any of your prescribed medications? No   Is the patient taking medications as prescribed? yes   Who is going to help you get home at discharge? Daughter, Misty Austin.   How do you get to doctors appointments? car, drives self   Are you on dialysis? No   Do you take coumadin? No   Discharge Plan A Home with family   Discharge Plan B Home   DME Needed Upon Discharge  none   Discharge Plan discussed with: Patient   Discharge Barriers Identified None   OTHER   Name(s) of People in Home Patient resides with her Fiance, Kanu Navarrete.       Pt's PCP is Juan Manuel Payne.  Pt reports her significant other, Kanu  Landon is currently hospitalized at [facility].  Pt reports her POA is her sister, Misty Austin ([phone]).  No barriers to discharge at this time.   Laceration of hand without foreign body, unspecified laterality, initial encounter

## 2023-07-27 ENCOUNTER — EMERGENCY (EMERGENCY)
Facility: HOSPITAL | Age: 33
LOS: 0 days | Discharge: HOME | End: 2023-07-28
Attending: STUDENT IN AN ORGANIZED HEALTH CARE EDUCATION/TRAINING PROGRAM
Payer: MEDICAID

## 2023-07-27 VITALS
TEMPERATURE: 99 F | DIASTOLIC BLOOD PRESSURE: 96 MMHG | HEART RATE: 104 BPM | OXYGEN SATURATION: 100 % | RESPIRATION RATE: 18 BRPM | SYSTOLIC BLOOD PRESSURE: 150 MMHG

## 2023-07-27 VITALS — HEIGHT: 67 IN | WEIGHT: 201.94 LBS

## 2023-07-27 DIAGNOSIS — L03.112 CELLULITIS OF LEFT AXILLA: ICD-10-CM

## 2023-07-27 PROCEDURE — 99283 EMERGENCY DEPT VISIT LOW MDM: CPT

## 2023-07-27 RX ORDER — CEPHALEXIN 500 MG
500 CAPSULE ORAL ONCE
Refills: 0 | Status: COMPLETED | OUTPATIENT
Start: 2023-07-27 | End: 2023-07-27

## 2023-07-27 RX ORDER — CEPHALEXIN 500 MG
1 CAPSULE ORAL
Qty: 28 | Refills: 0
Start: 2023-07-27 | End: 2023-08-02

## 2023-07-27 NOTE — ED ADULT TRIAGE NOTE - CHIEF COMPLAINT QUOTE
Pt ambulatory to ED c/o L flank redness, itchiness x 1 week. Pt denies any chills/fever. Pt has hx HTN and NKDA. No medications PTA. Pt AOX4 and no s/s of distress.

## 2023-07-28 RX ADMIN — Medication 500 MILLIGRAM(S): at 00:16

## 2023-07-28 NOTE — ED STATDOCS - CLINICAL SUMMARY MEDICAL DECISION MAKING FREE TEXT BOX
adult female comes in with spreading skin cellulitis.  vitals are normal no fever.  she is well-appearing.  antibiotics given sent to pharmacy will discharge.

## 2023-07-28 NOTE — ED STATDOCS - NORMAL, MLM
Pt would like all scripts moved to [facility], she can not afford the co-pays that [facility] is requiring. Thanks!   shiva all pertinent systems normal

## 2023-07-28 NOTE — ED STATDOCS - SKIN, MLM
skin normal color for race, warm, dry and intact.   cellulitis is present in a large area under the left axilla extending into the left lower chest.  it was marked out with a surgical marker.

## 2023-07-28 NOTE — ED STATDOCS - PATIENT PORTAL LINK FT
You can access the FollowMyHealth Patient Portal offered by [facility] by registering at the following website: [email or web address]. By joining [employer]’s FollowMyHealth portal, you will also be able to view your health information using other applications (apps) compatible with our system.

## 2023-07-28 NOTE — ED STATDOCS - OBJECTIVE STATEMENT
33-year-old female no past medical history nondiabetic comes into the ED with red painful rash under her left axilla spreading around her left upper abdomen into her left lower chest and up towards her left breast.  Redness has been spreading over days.  Painful to touch.  No fevers no chills.  Feels well otherwise.  No vomiting.  No open wounds or trauma.

## 2023-07-28 NOTE — ED STATDOCS - PROGRESS NOTE DETAILS
cellulitis marked with surgical marker.  keflex given.  keflex sent to her pharmacy.  given patient strict return precautions if the rash and cellulitis is spreading.  otherwise outpatient follow-up.

## 2023-07-28 NOTE — ED STATDOCS - NSFOLLOWUPINSTRUCTIONS_ED_ALL_ED_FT
take keflex 4 times a day for a week.    Seek immediate medical assistance for any new or worsening symptoms. If you have issues obtaining follow up, please call: [phone] or [phone]  to obtain a doctor or specialist who takes your insurance in your area.     \  tome keflex 4 veces al día mulugeta daysi semana.    Busque asistencia médica inmediata para cualquier síntoma nuevo o que empeore. Si tiene problemas para obtener un seguimiento, llame al: [phone] o al [phone] para obtener un médico o especialista que acepte rangel seguro en rangel área.

## 2023-07-28 NOTE — ED ADULT NURSE NOTE - PRO INTERPRETER NEED 2
Bahraini F: none  E: K >4, Mag >2  N: dysphagia; pureed and thin liquids per speech/swallow  GI: Famotidine 20mg daily  DVT ppx: lovenox 40mg subQ q24h  Code Status: DNR/DNI, confirmed NOT comfort care F: D5+LR @65cc/hr x 24hrs  E: K >4, Mag >2  N: dysphagia; pureed and thin liquids per speech/swallow  GI: Famotidine 20mg daily  DVT ppx: lovenox 40mg subQ q24h  Code Status: DNR/DNI, confirmed NOT comfort care F: D5+LR @65cc/hr x 24hrs  E: K >4, Mag >2  N: dysphagia; pureed and thin liquids per speech/swallow  GI: Famotidine 20mg daily  DVT ppx: eliquis 2.5mg bid  Code Status: DNR/DNI, confirmed NOT comfort care

## 2023-07-28 NOTE — ED ADULT NURSE NOTE - NS ED NURSE RECORD ANOTHER VITAL SIGN
Rx Refill Note    Requested Prescriptions     Pending Prescriptions Disp Refills   • clopidogrel (PLAVIX) 75 MG tablet [Pharmacy Med Name: CLOPIDOGREL 75MG TABLETS] 90 tablet 0     Sig: TAKE 1 TABLET BY MOUTH EVERY DAY        Last office visit with prescribing clinician: 11/23/2022      Next office visit with prescribing clinician: 2/23/2023   Last labs:   Last refill: 10/31/2022   Pharmacy (be sure to add in Epic). correct               Yes

## 2023-08-08 ENCOUNTER — OUTPATIENT (OUTPATIENT)
Dept: OUTPATIENT SERVICES | Facility: HOSPITAL | Age: 33
LOS: 1 days | End: 2023-08-08
Payer: MEDICAID

## 2023-08-08 ENCOUNTER — RESULT REVIEW (OUTPATIENT)
Age: 33
End: 2023-08-08

## 2023-08-08 DIAGNOSIS — K31.84 GASTROPARESIS: ICD-10-CM

## 2023-08-08 PROCEDURE — 78264 GASTRIC EMPTYING IMG STUDY: CPT | Mod: 26,GC

## 2023-08-08 PROCEDURE — 82962 GLUCOSE BLOOD TEST: CPT

## 2023-08-08 PROCEDURE — A9541: CPT

## 2023-08-08 PROCEDURE — 78264 GASTRIC EMPTYING IMG STUDY: CPT

## 2023-10-10 NOTE — ED STATDOCS - WORK/EXCUSE FORM DATE
15-Sep-2020 Rituxan Counseling:  I discussed with the patient the risks of Rituxan infusions. Side effects can include infusion reactions, severe drug rashes including mucocutaneous reactions, reactivation of latent hepatitis and other infections and rarely progressive multifocal leukoencephalopathy.  All of the patient's questions and concerns were addressed.

## 2023-12-15 NOTE — PATIENT PROFILE OB - BABYS CARE PROVIDER NAME, OB PROFILE
Page 2 of form not received by [address] [place] [address] 5  Will refax after PCP signs on Mon
Juan Jose

## 2024-02-12 ENCOUNTER — EMERGENCY (EMERGENCY)
Facility: HOSPITAL | Age: 34
LOS: 0 days | Discharge: HOME | End: 2024-02-12
Attending: EMERGENCY MEDICINE
Payer: MEDICAID

## 2024-02-12 VITALS
TEMPERATURE: 99 F | RESPIRATION RATE: 19 BRPM | DIASTOLIC BLOOD PRESSURE: 50 MMHG | OXYGEN SATURATION: 100 % | HEART RATE: 107 BPM | SYSTOLIC BLOOD PRESSURE: 111 MMHG

## 2024-02-12 VITALS — WEIGHT: 205.91 LBS

## 2024-02-12 DIAGNOSIS — N83.201 UNSPECIFIED OVARIAN CYST, RIGHT SIDE: ICD-10-CM

## 2024-02-12 DIAGNOSIS — N12 TUBULO-INTERSTITIAL NEPHRITIS, NOT SPECIFIED AS ACUTE OR CHRONIC: ICD-10-CM

## 2024-02-12 DIAGNOSIS — M54.89 OTHER DORSALGIA: ICD-10-CM

## 2024-02-12 DIAGNOSIS — N39.0 URINARY TRACT INFECTION, SITE NOT SPECIFIED: ICD-10-CM

## 2024-02-12 DIAGNOSIS — I10 ESSENTIAL (PRIMARY) HYPERTENSION: ICD-10-CM

## 2024-02-12 DIAGNOSIS — R31.9 HEMATURIA, UNSPECIFIED: ICD-10-CM

## 2024-02-12 LAB
ALBUMIN SERPL ELPH-MCNC: 3.3 G/DL — SIGNIFICANT CHANGE UP (ref 3.3–5)
ALP SERPL-CCNC: 61 U/L — SIGNIFICANT CHANGE UP (ref 40–120)
ALT FLD-CCNC: 35 U/L — SIGNIFICANT CHANGE UP (ref 12–78)
ANION GAP SERPL CALC-SCNC: 8 MMOL/L — SIGNIFICANT CHANGE UP (ref 5–17)
APPEARANCE UR: ABNORMAL
AST SERPL-CCNC: 15 U/L — SIGNIFICANT CHANGE UP (ref 15–37)
BACTERIA # UR AUTO: ABNORMAL /HPF
BASOPHILS # BLD AUTO: 0 K/UL — SIGNIFICANT CHANGE UP (ref 0–0.2)
BASOPHILS NFR BLD AUTO: 0 % — SIGNIFICANT CHANGE UP (ref 0–2)
BILIRUB SERPL-MCNC: 0.7 MG/DL — SIGNIFICANT CHANGE UP (ref 0.2–1.2)
BILIRUB UR-MCNC: ABNORMAL
BUN SERPL-MCNC: 14 MG/DL — SIGNIFICANT CHANGE UP (ref 7–23)
CALCIUM SERPL-MCNC: 9.2 MG/DL — SIGNIFICANT CHANGE UP (ref 8.5–10.1)
CAST: 6 /LPF — HIGH (ref 0–4)
CHLORIDE SERPL-SCNC: 105 MMOL/L — SIGNIFICANT CHANGE UP (ref 96–108)
CO2 SERPL-SCNC: 23 MMOL/L — SIGNIFICANT CHANGE UP (ref 22–31)
COLOR SPEC: SIGNIFICANT CHANGE UP
COMMENT - URINE: SIGNIFICANT CHANGE UP
CREAT SERPL-MCNC: 1.01 MG/DL — SIGNIFICANT CHANGE UP (ref 0.5–1.3)
DIFF PNL FLD: ABNORMAL
EGFR: 75 ML/MIN/1.73M2 — SIGNIFICANT CHANGE UP
EOSINOPHIL # BLD AUTO: 0 K/UL — SIGNIFICANT CHANGE UP (ref 0–0.5)
EOSINOPHIL NFR BLD AUTO: 0 % — SIGNIFICANT CHANGE UP (ref 0–6)
GLUCOSE SERPL-MCNC: 134 MG/DL — HIGH (ref 70–99)
GLUCOSE UR QL: NEGATIVE MG/DL — SIGNIFICANT CHANGE UP
GRAN CASTS # UR COMP ASSIST: SIGNIFICANT CHANGE UP
HCG SERPL-ACNC: <1 MIU/ML — SIGNIFICANT CHANGE UP
HCT VFR BLD CALC: 38.5 % — SIGNIFICANT CHANGE UP (ref 34.5–45)
HGB BLD-MCNC: 12.9 G/DL — SIGNIFICANT CHANGE UP (ref 11.5–15.5)
HYALINE CASTS # UR AUTO: SIGNIFICANT CHANGE UP
HYPOSEGMENTATION: PRESENT — SIGNIFICANT CHANGE UP
KETONES UR-MCNC: ABNORMAL MG/DL
LEUKOCYTE ESTERASE UR-ACNC: ABNORMAL
LYMPHOCYTES # BLD AUTO: 1.69 K/UL — SIGNIFICANT CHANGE UP (ref 1–3.3)
LYMPHOCYTES # BLD AUTO: 8 % — LOW (ref 13–44)
MANUAL SMEAR VERIFICATION: SIGNIFICANT CHANGE UP
MCHC RBC-ENTMCNC: 27.9 PG — SIGNIFICANT CHANGE UP (ref 27–34)
MCHC RBC-ENTMCNC: 33.5 GM/DL — SIGNIFICANT CHANGE UP (ref 32–36)
MCV RBC AUTO: 83.3 FL — SIGNIFICANT CHANGE UP (ref 80–100)
MONOCYTES # BLD AUTO: 2.11 K/UL — HIGH (ref 0–0.9)
MONOCYTES NFR BLD AUTO: 10 % — SIGNIFICANT CHANGE UP (ref 2–14)
NEUTROPHILS # BLD AUTO: 17.07 K/UL — HIGH (ref 1.8–7.4)
NEUTROPHILS NFR BLD AUTO: 64 % — SIGNIFICANT CHANGE UP (ref 43–77)
NEUTS BAND # BLD: 17 % — HIGH (ref 0–8)
NITRITE UR-MCNC: POSITIVE
NRBC # BLD: 0 /100 WBCS — SIGNIFICANT CHANGE UP (ref 0–0)
NRBC # BLD: SIGNIFICANT CHANGE UP /100 WBCS (ref 0–0)
PH UR: 5.5 — SIGNIFICANT CHANGE UP (ref 5–8)
PLAT MORPH BLD: NORMAL — SIGNIFICANT CHANGE UP
PLATELET # BLD AUTO: 196 K/UL — SIGNIFICANT CHANGE UP (ref 150–400)
POCT URINE PREGNANCY TEST: NEGATIVE — SIGNIFICANT CHANGE UP
POTASSIUM SERPL-MCNC: 3.3 MMOL/L — LOW (ref 3.5–5.3)
POTASSIUM SERPL-SCNC: 3.3 MMOL/L — LOW (ref 3.5–5.3)
PROT SERPL-MCNC: 7.7 GM/DL — SIGNIFICANT CHANGE UP (ref 6–8.3)
PROT UR-MCNC: 100 MG/DL
RBC # BLD: 4.62 M/UL — SIGNIFICANT CHANGE UP (ref 3.8–5.2)
RBC # FLD: 14.3 % — SIGNIFICANT CHANGE UP (ref 10.3–14.5)
RBC BLD AUTO: SIGNIFICANT CHANGE UP
RBC CASTS # UR COMP ASSIST: 4 /HPF — SIGNIFICANT CHANGE UP (ref 0–4)
SODIUM SERPL-SCNC: 136 MMOL/L — SIGNIFICANT CHANGE UP (ref 135–145)
SP GR SPEC: 1.02 — SIGNIFICANT CHANGE UP (ref 1–1.03)
SQUAMOUS # UR AUTO: 20 /HPF — HIGH (ref 0–5)
TOXIC GRANULES BLD QL SMEAR: PRESENT — SIGNIFICANT CHANGE UP
UROBILINOGEN FLD QL: 1 MG/DL — SIGNIFICANT CHANGE UP (ref 0.2–1)
VARIANT LYMPHS # BLD: 1 % — SIGNIFICANT CHANGE UP (ref 0–6)
WBC # BLD: 21.08 K/UL — HIGH (ref 3.8–10.5)
WBC # FLD AUTO: 21.08 K/UL — HIGH (ref 3.8–10.5)
WBC UR QL: 339 /HPF — HIGH (ref 0–5)

## 2024-02-12 PROCEDURE — 76830 TRANSVAGINAL US NON-OB: CPT

## 2024-02-12 PROCEDURE — 85025 COMPLETE CBC W/AUTO DIFF WBC: CPT

## 2024-02-12 PROCEDURE — 99285 EMERGENCY DEPT VISIT HI MDM: CPT

## 2024-02-12 PROCEDURE — 74176 CT ABD & PELVIS W/O CONTRAST: CPT | Mod: 26,MA

## 2024-02-12 PROCEDURE — 81001 URINALYSIS AUTO W/SCOPE: CPT

## 2024-02-12 PROCEDURE — 96374 THER/PROPH/DIAG INJ IV PUSH: CPT

## 2024-02-12 PROCEDURE — 76830 TRANSVAGINAL US NON-OB: CPT | Mod: 26

## 2024-02-12 PROCEDURE — 96375 TX/PRO/DX INJ NEW DRUG ADDON: CPT

## 2024-02-12 PROCEDURE — 80053 COMPREHEN METABOLIC PANEL: CPT

## 2024-02-12 PROCEDURE — 36415 COLL VENOUS BLD VENIPUNCTURE: CPT

## 2024-02-12 PROCEDURE — 87086 URINE CULTURE/COLONY COUNT: CPT

## 2024-02-12 PROCEDURE — 99284 EMERGENCY DEPT VISIT MOD MDM: CPT | Mod: 25

## 2024-02-12 PROCEDURE — 81025 URINE PREGNANCY TEST: CPT

## 2024-02-12 PROCEDURE — 84702 CHORIONIC GONADOTROPIN TEST: CPT

## 2024-02-12 PROCEDURE — 87186 SC STD MICRODIL/AGAR DIL: CPT

## 2024-02-12 PROCEDURE — 74176 CT ABD & PELVIS W/O CONTRAST: CPT | Mod: MA

## 2024-02-12 RX ORDER — CEFTRIAXONE 500 MG/1
1000 INJECTION, POWDER, FOR SOLUTION INTRAMUSCULAR; INTRAVENOUS ONCE
Refills: 0 | Status: COMPLETED | OUTPATIENT
Start: 2024-02-12 | End: 2024-02-12

## 2024-02-12 RX ORDER — CEFPODOXIME PROXETIL 100 MG
1 TABLET ORAL
Qty: 20 | Refills: 0
Start: 2024-02-12 | End: 2024-02-21

## 2024-02-12 RX ORDER — KETOROLAC TROMETHAMINE 30 MG/ML
30 SYRINGE (ML) INJECTION ONCE
Refills: 0 | Status: DISCONTINUED | OUTPATIENT
Start: 2024-02-12 | End: 2024-02-12

## 2024-02-12 RX ORDER — POTASSIUM CHLORIDE 20 MEQ
40 PACKET (EA) ORAL ONCE
Refills: 0 | Status: COMPLETED | OUTPATIENT
Start: 2024-02-12 | End: 2024-02-12

## 2024-02-12 RX ORDER — CEFTRIAXONE 500 MG/1
1000 INJECTION, POWDER, FOR SOLUTION INTRAMUSCULAR; INTRAVENOUS ONCE
Refills: 0 | Status: DISCONTINUED | OUTPATIENT
Start: 2024-02-12 | End: 2024-02-12

## 2024-02-12 RX ORDER — SODIUM CHLORIDE 9 MG/ML
1000 INJECTION INTRAMUSCULAR; INTRAVENOUS; SUBCUTANEOUS ONCE
Refills: 0 | Status: COMPLETED | OUTPATIENT
Start: 2024-02-12 | End: 2024-02-12

## 2024-02-12 RX ADMIN — SODIUM CHLORIDE 1000 MILLILITER(S): 9 INJECTION INTRAMUSCULAR; INTRAVENOUS; SUBCUTANEOUS at 20:01

## 2024-02-12 RX ADMIN — CEFTRIAXONE 1000 MILLIGRAM(S): 500 INJECTION, POWDER, FOR SOLUTION INTRAMUSCULAR; INTRAVENOUS at 20:40

## 2024-02-12 RX ADMIN — Medication 40 MILLIEQUIVALENT(S): at 20:39

## 2024-02-12 RX ADMIN — Medication 30 MILLIGRAM(S): at 20:04

## 2024-02-12 NOTE — ED STATDOCS - NSFOLLOWUPINSTRUCTIONS_ED_ALL_ED_FT
Michelle un seguimiento con granda médico de atención primaria y con granda ginecólogo.     Empiece a bakari antibióticos mañana. [place] [facility] Tylenol 650-1000 mg cada 6 horas según sea necesario para el dolor. No exceda los 4.000 mg en un período de 24 horas. [place] [facility] ibuprofeno de 600 a 800 mg cada 6 horas según sea necesario para el dolor moderado; tómelo con alimentos.     Regrese a la ana de emergencias si presenta síntomas nuevos o que empeoran, incluidos dolor de espalda, fiebre, escalofríos, náuseas o vómitos.    Pielonefritis en los adultos  Pyelonephritis, Adult  Body outline showing the urinary system, with a close-up of a normal kidney and an infected kidney.  La pielonefritis es daysi infección que se produce en el riñón. Los riñones son los órganos que filtran la holly y pasan los residuos del torrente sanguíneo a la orina. La orina sale desde los riñones, a través de tubos llamados uréteres, hacia la vejiga. Hay dos tipos principales de esta afección:  Pielonefritis aguda. Se trata de infecciones que aparecen rápidamente y sin ninguna advertencia.  Pielonefritis crónica. Estas infecciones gann mucho tiempo.  En la mayoría de los casos, la infección desaparece sin tratamiento. En los casos más graves, la infección puede propagarse al torrente sanguíneo u ocasionar otros problemas en los riñones.    ¿Cuáles son las causas?  A menudo, la causa de esta enfermedad son las bacterias. Las bacterias pueden desplazarse:  Desde la vejiga hasta el riñón. Kingsley puede ocurrir después de tener daysi infección en la vejiga (cistitis) o daysi infección urinaria (IU).  Desde el torrente sanguíneo hasta el riñón.  ¿Qué incrementa el riesgo?  Es más probable que sufra esta afección si:  Es palmer. El riesgo es aún mayor si está embarazada.  Es daysi persona de edad avanzada.  Tiene lo siguiente:  Diabetes.  Prostatitis. Es daysi inflamación de la próstata.  Cálculos renales o en la vejiga.  Problemas en los riñones o en los uréteres.  Cáncer.  Lesión de la médula nguyễn o daño nervioso alrededor de la vejiga.  Tiene colocado un tubo blando (catéter) en la vejiga.  Es sexualmente activo y usa espermicidas.  Tiene o ha tenido daysi infección urinaria (IU).  ¿Cuáles son los signos o síntomas?  Los síntomas de esta afección incluyen:  Necesidad imperiosa de orinar que es rosalia o que no desaparece. También orina con más frecuencia que lo normal.  Sensación de ardor o escozor al orinar.  Dolor. Puede presentarse en abdomen, la espalda, el costado del cuerpo o la cheyenne.  Fiebre o escalofríos.  Náuseas o vómitos.  Orina con holly, oscura, turbia o con mal olor.  ¿Cómo se diagnostica?  Esta afección se puede diagnosticar en función de los antecedentes médicos y un examen físico. También pueden hacerle estudios, por ejemplo:  Análisis de orina.  Análisis de holly.  Estudios de diagnóstico por imágenes de los riñones. Estos pueden incluir daysi ecografía o daysi exploración por tomografía computarizada (TC).  ¿Cómo se trata?  El tratamiento de esta afección depende de la gravedad de la infección.  Si la infección es leve y se detecta de forma temprana, pueden administrarle antibióticos por vía oral. Deberá beber mucho líquido.  Si la infección es más grave, es posible que deba permanecer en el hospital y recibir antibióticos por vía intravenosa (IV). También puede recibir líquidos a través de daysi vía intravenosa. Después de dejar el hospital, es posible que deba bakari antibióticos por vía oral.  Es posible que se necesiten otros tratamientos. Estos dependerán de la causa de la infección.    Siga estas instrucciones en granda casa:  Comida y bebida    Kajal suficiente líquido oren para mantener la orina de color amarillo pálido.  Evite la cafeína, el té y las bebidas gaseosas. Estas sustancias pueden irritar la vejiga.  Instrucciones generales    Use los medicamentos recetados y de venta symone oren se lo haya indicado el médico. Termine los antibióticos aunque comience a sentirse mejor.  Orine con frecuencia. Evite retener la orina scarlett largos períodos.  Orine antes y después de las relaciones sexuales.  Si es palmer, higienícese de adelante hacia atrás después de tener deposiciones. Use cada papel solamente daysi vez.  Concurra a todas las visitas de seguimiento. Granda médico querrá asegurarse de que la infección se haya resuelto.  Comuníquese con un médico si:  Los síntomas no mejoran después de 2 mary de tratamiento.  Shruthi síntomas empeoran.  Tiene fiebre o escalofríos.  No puede bakari los antibióticos.  Solicite ayuda de inmediato si:  Vomita cada vez que come o derrick.  Tiene dolor intenso en la espalda o en el costado.  Se siente muy débil o se desmaya.  Esta información no tiene oren fin reemplazar el consejo del médico. Asegúrese de hacerle al médico cualquier pregunta que tenga.  ----------------------------------------------  Quiste ovárico    LO QUE NECESITA SABER:    ¿Qué es un quiste ovárico?Un quiste ovárico es un saco lleno de líquido que crece dentro de un ovario o sobre kandy. Usted tiene 2 ovarios, 1 a cada lado del útero. Son pequeños, del tamaño y la forma similares a daysi lisa. Los quistes ováricos son comunes en las mujeres que tienen ciclos menstruales regulares. Scarlett granda ciclo menstrual, los óvulos son liberados de los ovarios. El quiste suele contener líquido leah, en ocasiones, puede contener holly o tejido. La mayoría de los quistes ováricos no son de gran cuidado y desaparecen en varios meses sin necesidad de tratamiento. Sin embargo, algunos quistes pueden crecer grandes y causar dolor o romperse.  Sistema reproductor femenino    ¿Qué ocasiona los quistes ováricos?    Problemas hormonales    Medicamentos que le ayudan a ovular    Endometriosis    Embarazo    Daysi infección grave en la pelvis  ¿Cuáles son los signos y síntomas de los quistes ováricos?Es posible que tenga presión, hinchazón o inflamación en la parte inferior del abdomen en el lado del quiste. También puede tener un dolor sordo o kalyn que puede ir y venir. Los siguientes son signos y síntomas menos comunes:    Dolor sordo en la parte baja de la espalda y los muslos    Sangrado vaginal inusual    Aumento de peso que no esperaba ni planificaba    Dolor scarlett el ciclo menstrual    Sensibilidad en las mamas    Dificultad para vaciar completamente los intestinos o la vejiga    Necesidad de orinar a menudo    Dolor scarlett las relaciones sexuales  ¿Cómo se diagnostican los quistes ováricos?    Los análisis de sangrepueden mostrar qué tipo de quiste tiene y si necesita tratamiento.    Un examen pélvicopuede ayudar a granda médico a sentir un quiste ovárico.    Daysi ecografía pélvicapara julia si tiene un quiste en el ovario. El transductor de la ecografía usa ondas sonoras para mostrar imágenes en un monitor. Kandy transductor se inserta en la vagina y se guía hacia arriba, en dirección al útero.  ¿Cómo se tratan los quistes ováricos?El tratamiento dependerá de granda edad, los síntomas y el tipo de quiste que tiene. Es posible que usted necesite alguno de los siguientes:    Conducta expectantepodría recomendarse. Kingsley significa que el quiste no se trata de inmediato. Deberá estar atenta a cualquier signo o síntoma que indique que el quiste está creciendo. Es posible que tenga que volver para daysi o más ecografías después de un cierto tiempo. Estas mostrarán si el quiste ha cambiado de tamaño.    Medicamentos:  Píldoras anticonceptivaspodrían ayudar a controlar el ciclo menstrual, prevenir los quistes o hacer que los quistes se reduzcan de tamaño.    Acetaminofénalivia el dolor y baja la fiebre. Está disponible sin receta médica. Pregunte la cantidad y la frecuencia con que debe tomarlos. Siga las indicaciones. Jv las etiquetas de todos los demás medicamentos que esté usando para saber si también contienen acetaminofén, o pregunte a granda médico o farmacéutico. El acetaminofén puede causar daño en el hígado cuando no se jean-paul de forma correcta.    AINEcomo el ibuprofeno, ayudan a disminuir la inflamación, el dolor y la fiebre. Kandy medicamento está disponible con o sin daysi receta médica. Los ADEN pueden causar sangrado estomacal o problemas renales en ciertas personas. Si usted jean-paul un medicamento anticoagulante, siempre pregúntele a granda médico si los ADEN son seguros para usted. Siempre jv la etiqueta de kandy medicamento y siga las instrucciones.    Puede administrarsepodrían administrarse. Pregunte al médico cómo debe bakari kandy medicamento de forma ken. Algunos medicamentos recetados para el dolor contienen acetaminofén. No tome otros medicamentos que contengan acetaminofén sin consultarlo con granda médico. Demasiado acetaminofeno puede causar daño al hígado. Los medicamentos recetados para el dolor podrían causar estreñimiento. Pregunte a granda médico oren prevenir o tratar estreñimiento.    La cirugíapodría ser necesaria para extirpar el quiste ovárico.  ¿Cómo puedo controlar los quistes ováricos?Puede controlar un quiste actual y ayudar a los médicos a encontrar quistes futuros en forma temprana.    Aplique calor para reducir dolor y los calambres causados por el quisteSiéntese en la rosalina del baño en agua tibia o coloque un colchón térmico (a temperatura baja) sobre el abdomen. Michelle esto por 15 a 20 minutos cada hora para sentirse más cómoda.    Hágase exámenes pélvicos o pruebas de Papanicolaou en forma regular.Kingsley ayudará a los médicos a encontrar cualquier quiste ovárico nuevo. Informe a granda médico si nota cualquier cambio inusual en granda ciclo menstrual.  Llame al número de emergencias local (911 en los [place]) si:    Usted tiene un intenso dolor con fiebre y vómitos.    Usted tiene dolor abdominal severo y repentino.    Usted se siente demasiado débil, mareada o que se va a desmayar oren para ponerse de pie.    Tiene la respiración muy acelerada.  ¿Cuándo nichole llamar a mi médico?    Shruthi períodos son antes o después de tiempo o más dolorosos que de costumbre.    Usted tiene preguntas o inquietudes acerca de granda condición o cuidado.  ACUERDOS SOBRE GRANDA CUIDADO:    Usted tiene el derecho de ayudar a planear granda cuidado. Aprenda todo lo que pueda sobre granda condición y oren darle tratamiento. Discuta shruthi opciones de tratamiento con shruthi médicos para decidir el cuidado que usted desea recibir. Usted siempre tiene el derecho de rechazar el tratamiento.

## 2024-02-12 NOTE — ED STATDOCS - OBJECTIVE STATEMENT
Pt is a 33y female w/ a PMH of HTN presents to the ED c/o b/l back pain, hematuria, fever, and body aches for 4 days, worsened today. Denies CP, SOB, vaginal discharge or bleeding. Does not think she is pregnant.

## 2024-02-12 NOTE — ED STATDOCS - ATTENDING APP SHARED VISIT CONTRIBUTION OF CARE
I, Desiree Patterson MD, performed the initial face to face bedside interview with this patient regarding history of present illness, review of symptoms and relevant past medical, social and family history.  I completed an independent physical examination.  I was the initial provider who evaluated this patient. I have signed out the follow up of any pending tests (i.e. labs, radiological studies) to the ACP.  I have communicated the patient’s plan of care and disposition with the ACP.  The history, relevant review of systems, past medical and surgical history, medical decision making, and physical examination was documented by the scribe in my presence and I attest to the accuracy of the documentation.

## 2024-02-12 NOTE — ED STATDOCS - CLINICAL SUMMARY MEDICAL DECISION MAKING FREE TEXT BOX
Adult female p/w hematuria, L > R back pain. Does not have a h/o renal stones. Plan for CTs, medicate, and reassess.

## 2024-02-12 NOTE — ED STATDOCS - PATIENT PORTAL LINK FT
You can access the FollowMyHealth Patient Portal offered by [facility] by registering at the following website: [email or web address]. By joining [employer]’s FollowMyHealth portal, you will also be able to view your health information using other applications (apps) compatible with our system.

## 2024-02-12 NOTE — ED STATDOCS - PROGRESS NOTE DETAILS
Labs and imaging reviewed. WBC 21, K 3.3, rest of labs WNL. UA demonstrates UTI. CT scan positive for left perinephritic stranding c/w pyelo, as well as right adnexal cyst. Discussed all findings with pt. Will supplement K, Start Ceftriaxone, obtain pelvic US, reassess. - Tressa Rajan PA-C Ultrasound performed and reviewed. Pt with simple right ovarian cyst at 4.7cm, likely physiologic. Good doppler flow, no signs of torsion. Discussed results with pt. Notes she has to follow up with her GYN as cyst puts pt at risk for torsion. Pt reassessed at bedside, feels better. Vital signs improved, HR now 107, temp 98.8. Offered admission to medicine for IV abx for pyelo, pt declined, wants to go home on po abx. Will send cefpodoxime to pharmacy. Strict return to ED precautions were given including worsening fever, chills, back pain, n/v. Pt understands and agrees to plan if she develops worsening symptoms she may need IV abx. All questions and concerns addressed, stable for dc. - Tressa Rajna PA-C

## 2024-02-12 NOTE — ED STATDOCS - PHYSICAL EXAMINATION
Constitutional: NAD   Eyes: PERRLA  Head: Normocephalic   Mouth: MMM  Cardiac: regular rate   Resp: Lungs CTAB  GI: Abd s/nt/nd, no rebound or guarding. +L >R CVAT.  Neuro: awake, alert, moving all extremities  Skin: No rashes

## 2024-08-15 NOTE — DISCHARGE NOTE OB - YES
Bill For Surgical Tray: no Lab Facility: 0 Expected Date Of Service: 08/15/2024 Performing Laboratory: [id number] Billing Type: Third-Party Bill Statement Selected

## 2025-02-10 ENCOUNTER — APPOINTMENT (OUTPATIENT)
Dept: ULTRASOUND IMAGING | Facility: CLINIC | Age: 35
End: 2025-02-10

## 2025-02-10 ENCOUNTER — OUTPATIENT (OUTPATIENT)
Dept: OUTPATIENT SERVICES | Facility: HOSPITAL | Age: 35
LOS: 1 days | End: 2025-02-10

## 2025-02-10 DIAGNOSIS — Z00.8 ENCOUNTER FOR OTHER GENERAL EXAMINATION: ICD-10-CM

## 2025-03-27 NOTE — DISCHARGE NOTE OB - INCREASED VAGINAL BLEEDING OR LARGE CLOTS (SATURATING A PAD AN HOUR)
D: Client attended 30 minute sleep group to watch a nature documentary. Client was observed actively listening during documentary.    Statement Selected

## 2025-06-18 ENCOUNTER — OUTPATIENT (OUTPATIENT)
Dept: OUTPATIENT SERVICES | Facility: HOSPITAL | Age: 35
LOS: 1 days | End: 2025-06-18
Payer: COMMERCIAL

## 2025-06-18 ENCOUNTER — APPOINTMENT (OUTPATIENT)
Dept: ULTRASOUND IMAGING | Facility: CLINIC | Age: 35
End: 2025-06-18
Payer: COMMERCIAL

## 2025-06-18 DIAGNOSIS — Z00.8 ENCOUNTER FOR OTHER GENERAL EXAMINATION: ICD-10-CM

## 2025-06-18 PROCEDURE — 76856 US EXAM PELVIC COMPLETE: CPT

## 2025-06-18 PROCEDURE — 76856 US EXAM PELVIC COMPLETE: CPT | Mod: 26

## 2025-06-18 PROCEDURE — 76830 TRANSVAGINAL US NON-OB: CPT | Mod: 26

## 2025-06-18 PROCEDURE — 76830 TRANSVAGINAL US NON-OB: CPT
